# Patient Record
Sex: FEMALE | Race: WHITE | Employment: OTHER | URBAN - METROPOLITAN AREA
[De-identification: names, ages, dates, MRNs, and addresses within clinical notes are randomized per-mention and may not be internally consistent; named-entity substitution may affect disease eponyms.]

---

## 2019-10-17 ENCOUNTER — OFFICE VISIT (OUTPATIENT)
Dept: ORTHOPEDIC SURGERY | Age: 44
End: 2019-10-17

## 2019-10-17 VITALS
RESPIRATION RATE: 16 BRPM | HEART RATE: 61 BPM | SYSTOLIC BLOOD PRESSURE: 105 MMHG | OXYGEN SATURATION: 100 % | HEIGHT: 64 IN | TEMPERATURE: 97.9 F | BODY MASS INDEX: 20.93 KG/M2 | WEIGHT: 122.6 LBS | DIASTOLIC BLOOD PRESSURE: 70 MMHG

## 2019-10-17 DIAGNOSIS — M54.12 CERVICAL RADICULOPATHY: ICD-10-CM

## 2019-10-17 DIAGNOSIS — M19.012 ARTHRITIS OF LEFT STERNOCLAVICULAR JOINT: ICD-10-CM

## 2019-10-17 DIAGNOSIS — M54.2 NECK PAIN: Primary | ICD-10-CM

## 2019-10-17 RX ORDER — MELOXICAM 15 MG/1
15 TABLET ORAL
Qty: 30 TAB | Refills: 1 | Status: SHIPPED | OUTPATIENT
Start: 2019-10-17

## 2019-10-17 RX ORDER — METHYLPREDNISOLONE 4 MG/1
TABLET ORAL
Qty: 1 DOSE PACK | Refills: 0 | Status: SHIPPED | OUTPATIENT
Start: 2019-10-17

## 2019-10-17 RX ORDER — BACLOFEN 10 MG/1
10 TABLET ORAL 2 TIMES DAILY
Qty: 60 TAB | Refills: 1 | Status: SHIPPED | OUTPATIENT
Start: 2019-10-17 | End: 2019-11-09 | Stop reason: SDUPTHER

## 2019-10-17 NOTE — PROGRESS NOTES
Rosa Matias  1975   Chief Complaint   Patient presents with    Shoulder Pain     left shoulder        HISTORY OF PRESENT ILLNESS  Rosa Matias is a 45 y.o. female who presents today for evaluation of left shoulder pain. Pt referred by Dr. Cece Allen. She rates her pain 3/10 today. Pain has been present for 6 months. Pt reports swelling and soreness started around the sternal-clavicular joint. It then moved to the shoulder blade. She reports some numbness and tingling that moves down her arm to her fingers that started recently. Pain at night. Patient describes the pain as sore that is Intermittent in nature. Symptoms are worse with certain movement of the arm and neck, Activity and is better with  Rest. Associated symptoms include numbness, tingling. Since problem started, it: is unchanged. Pain does wake patient up at night. Has taken NSAID for the problem. Has tried following treatments: Injections:NO; Brace:NO; Therapy:NO; Cane/Crutch:NO       Allergies   Allergen Reactions    Shellfish Derived Itching        History reviewed. No pertinent past medical history.    Social History     Socioeconomic History    Marital status: UNKNOWN     Spouse name: Not on file    Number of children: Not on file    Years of education: Not on file    Highest education level: Not on file   Occupational History    Not on file   Social Needs    Financial resource strain: Not on file    Food insecurity:     Worry: Not on file     Inability: Not on file    Transportation needs:     Medical: Not on file     Non-medical: Not on file   Tobacco Use    Smoking status: Never Smoker    Smokeless tobacco: Never Used   Substance and Sexual Activity    Alcohol use: Not on file    Drug use: Not on file    Sexual activity: Not on file   Lifestyle    Physical activity:     Days per week: Not on file     Minutes per session: Not on file    Stress: Not on file   Relationships    Social connections:     Talks on phone: Not on file     Gets together: Not on file     Attends Jehovah's witness service: Not on file     Active member of club or organization: Not on file     Attends meetings of clubs or organizations: Not on file     Relationship status: Not on file    Intimate partner violence:     Fear of current or ex partner: Not on file     Emotionally abused: Not on file     Physically abused: Not on file     Forced sexual activity: Not on file   Other Topics Concern    Not on file   Social History Narrative    Not on file      History reviewed. No pertinent surgical history. Family History   Problem Relation Age of Onset    Hypertension Mother     Diabetes Mother     Heart Disease Father       No current outpatient medications on file. No current facility-administered medications for this visit. REVIEW OF SYSTEM   Patient denies: Weight loss, Fever/Chills, HA, Visual changes, Fatigue, Chest pain, SOB, Abdominal pain, N/V/D/C, Blood in stool or urine, Edema. Pertinent positive as above in HPI. All others were negative    PHYSICAL EXAM:   Visit Vitals  /70   Pulse 61   Temp 97.9 °F (36.6 °C) (Oral)   Resp 16   Ht 5' 4\" (1.626 m)   Wt 122 lb 9.6 oz (55.6 kg)   SpO2 100%   BMI 21.04 kg/m²     The patient is a well-developed, well-nourished female   in no acute distress. The patient is alert and oriented times three. The patient is alert and oriented times three. Mood and affect are normal.  LYMPHATIC: lymph nodes are not enlarged and are within normal limits  SKIN: normal in color and non tender to palpation. There are no bruises or abrasions noted. NEUROLOGICAL: Motor sensory exam is within normal limits. Reflexes are equal bilaterally.  There is normal sensation to pinprick and light touch  MUSCULOSKELETAL:  Examination Left shoulder   Skin Intact   AC joint tenderness -   Biceps tenderness -   Forward flexion/Elevation    Active abduction    Glenohumeral abduction 90   External rotation ROM 90 Internal rotation ROM 70   Apprehension -   Anmols Relocation -   Jerk -   Load and Shift -   Obriens -   Speeds -   Impingement sign -   Supraspinatus/Empty Can -, 5/5   External Rotation Strength -, 5/5   Lift Off/Belly Press -, 5/5   Neurovascular Intact   Swelling and tenderness over left sterno clavicular joint  Examination Neck   Skin Intact   Tenderness, Paracervical +   Paracervical spasms  +   Flexion Decreased 25%   Extension Decreased 25%   Lateral bend left Normal   Lateral bend right Normal   Masses -   Spurling sign +   Biceps reflex Normal   Triceps reflex Normal   Brachioradialis reflex Normal   Sensation Normal       IMAGING: XR of cervical spine dated 10/17/19 was reviewed and read: Loss of cervical lordosis. Decreased disc space at C4-C5 and C5-C6. IMPRESSION:      ICD-10-CM ICD-9-CM    1. Neck pain M54.2 723.1 AMB POC XRAY, SPINE, CERVICAL; 2 OR 3   2. Cervical radiculopathy M54.12 723.4 methylPREDNISolone (MEDROL DOSEPACK) 4 mg tablet      meloxicam (MOBIC) 15 mg tablet      baclofen (LIORESAL) 10 mg tablet      REFERRAL TO PHYSICAL THERAPY      MRI CERV SPINE WO CONT   3. Arthritis of left sternoclavicular joint M19.012 716.91 methylPREDNISolone (MEDROL DOSEPACK) 4 mg tablet      meloxicam (MOBIC) 15 mg tablet      baclofen (LIORESAL) 10 mg tablet      REFERRAL TO PHYSICAL THERAPY      MRI CERV SPINE WO CONT        PLAN:  1. Pt presents today with left shoulder and neck pain due to sternoclavicular arthritis and cervical radiculopathy. I would like her to begin PT and get an MRI of the cervical spine. Risk factors include: n/a  2. No ultrasound exam indicated today  3. No cortisone injection indicated today   4. Yes Physical/Occupational Therapy indicated today  5. Yes diagnostic test indicated today: MRI CERVICAL SPINE  6. No durable medical equipment indicated today  7. No referral indicated today   8. Yes medications indicated today: MEDROL DOSEPACK & BACLOFEN & MOBIC  9.  No Narcotic indicated today      RTC following MRI    Office note will be sent to referring provider    Scribed by Ananya Easton) as dictated by MD BRITTANY Correa, Dr. Ana Crooks, confirm that all documentation is accurate.     Ana Crooks M.D.   [facility]

## 2019-10-17 NOTE — PROGRESS NOTES
1. Have you been to the ER, urgent care clinic since your last visit? Hospitalized since your last visit? No    2. Have you seen or consulted any other health care providers outside of the [address] [place] [address] since your last visit? Include any pap smears or colon screening.  No

## 2019-11-09 DIAGNOSIS — M19.012 ARTHRITIS OF LEFT STERNOCLAVICULAR JOINT: ICD-10-CM

## 2019-11-09 DIAGNOSIS — M54.12 CERVICAL RADICULOPATHY: ICD-10-CM

## 2019-11-11 RX ORDER — BACLOFEN 10 MG/1
10 TABLET ORAL 2 TIMES DAILY
Qty: 60 TAB | Refills: 1 | Status: SHIPPED | OUTPATIENT
Start: 2019-11-11

## 2019-11-14 ENCOUNTER — HOSPITAL ENCOUNTER (OUTPATIENT)
Age: 44
Discharge: HOME | End: 2019-11-14
Attending: ORTHOPAEDIC SURGERY
Payer: COMMERCIAL

## 2019-11-14 DIAGNOSIS — M54.12 CERVICAL RADICULITIS: ICD-10-CM

## 2019-11-14 PROCEDURE — 72141 MRI NECK SPINE W/O DYE: CPT

## 2019-12-03 ENCOUNTER — OFFICE VISIT (OUTPATIENT)
Dept: ORTHOPEDIC SURGERY | Age: 44
End: 2019-12-03

## 2019-12-03 VITALS
HEIGHT: 64 IN | RESPIRATION RATE: 14 BRPM | DIASTOLIC BLOOD PRESSURE: 69 MMHG | HEART RATE: 66 BPM | OXYGEN SATURATION: 99 % | SYSTOLIC BLOOD PRESSURE: 112 MMHG | TEMPERATURE: 97.9 F | WEIGHT: 122.2 LBS | BODY MASS INDEX: 20.86 KG/M2

## 2019-12-03 DIAGNOSIS — M50.20 HERNIATED DISC, CERVICAL: Primary | ICD-10-CM

## 2019-12-03 NOTE — PROGRESS NOTES
1. Have you been to the ER, urgent care clinic since your last visit? Hospitalized since your last visit? No    2. Have you seen or consulted any other health care providers outside of the [address] [place] [address] since your last visit? Include any pap smears or colon screening.  No

## 2019-12-03 NOTE — PROGRESS NOTES
Yolie Aguirre  1975   Chief Complaint   Patient presents with    Shoulder Pain     right shoulder pain        HISTORY OF PRESENT ILLNESS  Yolie Aguirre is a 45 y.o. female who presents today for reevaluation of right shoulder pain and MRI review. Patient rates pain as 7/10 today. Pain has been present for over 7 months. Pt reports swelling and soreness started around the sternal-clavicular joint. It then moved to the shoulder blade. Pain at night. Still complains of numbness and tingling in the arms and hands and pain with turning the neck. Has taken Mobic and Baclofen. Patient denies any fever, chills, chest pain, shortness of breath or calf pain. The remainder of the review of systems is negative. There are no new illness or injuries to report since last seen in the office. There are no changes to medications, allergies, family or social history. Pain Assessment  12/3/2019   Location of Pain Shoulder   Location Modifiers Right   Severity of Pain 7   Quality of Pain Sharp   Quality of Pain Comment -   Duration of Pain Persistent   Frequency of Pain Constant   Aggravating Factors Bending;Stretching;Straightening   Relieving Factors Nothing   Result of Injury No     PHYSICAL EXAM:   Visit Vitals  /69 (BP 1 Location: Left arm, BP Patient Position: Sitting)   Pulse 66   Temp 97.9 °F (36.6 °C) (Oral)   Resp 14   Ht 5' 4\" (1.626 m)   Wt 122 lb 3.2 oz (55.4 kg)   SpO2 99%   BMI 20.98 kg/m²     The patient is a well-developed, well-nourished female   in no acute distress. The patient is alert and oriented times three. The patient is alert and oriented times three. Mood and affect are normal.  LYMPHATIC: lymph nodes are not enlarged and are within normal limits  SKIN: normal in color and non tender to palpation. There are no bruises or abrasions noted. NEUROLOGICAL: Motor sensory exam is within normal limits. Reflexes are equal bilaterally.  There is normal sensation to pinprick and light touch  MUSCULOSKELETAL:  Examination Left shoulder   Skin Intact   AC joint tenderness -   Biceps tenderness -   Forward flexion/Elevation    Active abduction    Glenohumeral abduction 90   External rotation ROM 90   Internal rotation ROM 70   Apprehension -   Anmols Relocation -   Jerk -   Load and Shift -   Obriens -   Speeds -   Impingement sign -   Supraspinatus/Empty Can -, 5/5   External Rotation Strength -, 5/5   Lift Off/Belly Press -, 5/5   Neurovascular Intact   Swelling and tenderness over left sterno clavicular joint  Examination Neck   Skin Intact   Tenderness, Paracervical +   Paracervical spasms  +   Flexion Decreased 25%   Extension Decreased 25%   Lateral bend left Normal   Lateral bend right Normal   Masses -   Spurling sign +   Biceps reflex Normal   Triceps reflex Normal   Brachioradialis reflex Normal   Sensation Normal        IMAGING: MRI of cervical spine dated 11/14/19 was reviewed and read by Dr. Amaya Come:   IMPRESSION:  C5/C6 disc and osteophyte complex with more focal disc components. No  significant central stenosis, but disc material in the foramina causing severe  left and mild to moderate right foraminal narrowing. C4/C5 disc and osteophyte complex with mild central canal stenosis, moderate  right and mild left foraminal narrowing. Less pronounced degenerative changes C3/C4 with posterior disc protrusion  causing borderline central canal narrowing, no foraminal narrowing. XR of cervical spine dated 10/17/19 was reviewed and read: Loss of cervical lordosis. Decreased disc space at C4-C5 and C5-C6. IMPRESSION:      ICD-10-CM ICD-9-CM    1. Herniated disc, cervical M50.20 722.0 REFERRAL TO SPINE SURGERY        PLAN:   1. Pt presents today with right shoulder and neck pain due to an MRI-documented cervical herniated disc and I would like to refer her to the [address] [place] [address]. She can continue to take the Baclofen and Mobic as needed. Risk factors include: n/a  2.  No ultrasound exam indicated today  3. No cortisone injection indicated today   4. No Physical/Occupational Therapy indicated today  5. No diagnostic test indicated today:   6. No durable medical equipment indicated today  7. Yes referral indicated today Kenneth  8. No medications indicated today:   9. No Narcotic indicated today       RTC prn      Scribed by [address]) as dictated by Marie Tom MD    I, Dr. Marie Tom, confirm that all documentation is accurate.     Marie Tom M.D.   [facility]

## 2019-12-06 ENCOUNTER — OFFICE VISIT (OUTPATIENT)
Dept: ORTHOPEDIC SURGERY | Age: 44
End: 2019-12-06

## 2019-12-06 VITALS
HEIGHT: 64 IN | HEART RATE: 61 BPM | DIASTOLIC BLOOD PRESSURE: 80 MMHG | WEIGHT: 121.6 LBS | BODY MASS INDEX: 20.76 KG/M2 | TEMPERATURE: 98.4 F | SYSTOLIC BLOOD PRESSURE: 127 MMHG | OXYGEN SATURATION: 100 % | RESPIRATION RATE: 19 BRPM

## 2019-12-06 DIAGNOSIS — M79.609 PARESTHESIA AND PAIN OF LEFT EXTREMITY: Primary | ICD-10-CM

## 2019-12-06 DIAGNOSIS — M47.812 CERVICAL SPONDYLOSIS: ICD-10-CM

## 2019-12-06 DIAGNOSIS — R20.2 PARESTHESIA AND PAIN OF LEFT EXTREMITY: Primary | ICD-10-CM

## 2019-12-06 DIAGNOSIS — M19.012 ARTHRITIS OF LEFT STERNOCLAVICULAR JOINT: ICD-10-CM

## 2019-12-06 RX ORDER — GABAPENTIN 300 MG/1
300 CAPSULE ORAL 3 TIMES DAILY
Qty: 90 CAP | Refills: 1 | Status: SHIPPED | OUTPATIENT
Start: 2019-12-06

## 2019-12-06 NOTE — PROGRESS NOTES
[address]  [address]  [place], [address] [place] [address] Street  Phone: [phone]  Fax: [phone]  INITIAL CONSULTATION  Patient: Lore Pearce                MRN: [id number]       SSN: [id number]  YOB: 1975        AGE: 45 y.o. SEX: female  Body mass index is 20.87 kg/m². PCP: Guadalupe Morrison MD  12/06/19    Chief Complaint   Patient presents with    Neck Pain     SC         HISTORY OF PRESENT ILLNESS, RADIOGRAPHS, and PLAN:         HISTORY OF PRESENT ILLNESS:  Ms. Acosta Kerr is seen today at the request of Dr. Ervin Hill. Ms. 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 She came in and saw Dr. Ervin Hill initially for some neck pain, some sternoclavicular joint swelling and arthritis and radiating numbness and tingling into her left arm. He gave her a Medrol Dosepak, which evidently resolved some of the sternoclavicular joint issues, but she continues with this cervical pseudo radiculopathy. She has numbness and tingling in her left hand, which is her dominant arm. She gets antalgic limited range of motion in her cervical spine. RADIOGRAPHS:  An MRI was ordered by Dr. Ervin Hill, which demonstrates spondylitic changes at C3-4, C4-5, C5-6 with some degree of left-sided foraminal stenosis, no dramatic central stenosis, and no gross instability evidenced on MRI. There is no signal change to the cord. PHYSICAL EXAM:  Her physical exam is benign other than her pain she is having in her arm. She has no reflex change and no motor change with some sense of numbness in the arm. She has no shoulder symptoms. ASSESSMENT/PLAN: At this point, I would like to get her in some physical therapy. I would like to obtain an EMG of her left upper extremity. We will start her on a dose of Neurontin and see if that is helpful, and we will have her take her NSAIDs on a regular basis.   I am going to have some lab work done on her. I would like to get a CBC, a sedimentation rate, and a C-reactive protein to see if there is any autoimmune or inflammatory markers present given the swelling she had figure-of-four this SC joint, and the pain associated with her neck and arm. We will see her back after these studies. cc: Nery Mills M.D. History reviewed. No pertinent past medical history. Family History   Problem Relation Age of Onset    Hypertension Mother     Diabetes Mother     Heart Disease Father        Current Outpatient Medications   Medication Sig Dispense Refill    baclofen (LIORESAL) 10 mg tablet TAKE 1 TAB BY MOUTH TWO (2) TIMES A DAY. TAKE AT NIGHT BEFORE BED 60 Tab 1    meloxicam (MOBIC) 15 mg tablet Take 1 Tab by mouth daily (with breakfast). 30 Tab 1    methylPREDNISolone (MEDROL DOSEPACK) 4 mg tablet Per dose pack instructions 1 Dose Pack 0       Allergies   Allergen Reactions    Shellfish Derived Itching       History reviewed. No pertinent surgical history. History reviewed. No pertinent past medical history.     Social History     Socioeconomic History    Marital status: UNKNOWN     Spouse name: Not on file    Number of children: Not on file    Years of education: Not on file    Highest education level: Not on file   Occupational History    Not on file   Social Needs    Financial resource strain: Not on file    Food insecurity:     Worry: Not on file     Inability: Not on file    Transportation needs:     Medical: Not on file     Non-medical: Not on file   Tobacco Use    Smoking status: Never Smoker    Smokeless tobacco: Never Used   Substance and Sexual Activity    Alcohol use: Not on file    Drug use: Not on file    Sexual activity: Not on file   Lifestyle    Physical activity:     Days per week: Not on file     Minutes per session: Not on file    Stress: Not on file   Relationships    Social connections:     Talks on phone: Not on file     Gets together: Not on file     Attends Cheondoism service: Not on file     Active member of club or organization: Not on file     Attends meetings of clubs or organizations: Not on file     Relationship status: Not on file    Intimate partner violence:     Fear of current or ex partner: Not on file     Emotionally abused: Not on file     Physically abused: Not on file     Forced sexual activity: Not on file   Other Topics Concern    Not on file   Social History Narrative    Not on file           REVIEW OF SYSTEMS:   CONSTITUTIONAL SYMPTOMS:  Negative. EYES:  Negative. EARS, NOSE, THROAT AND MOUTH:  Negative. CARDIOVASCULAR:  Negative. RESPIRATORY:  Negative. GENITOURINARY: Per HPI. GASTROINTESTINAL:  Per HPI. INTEGUMENTARY (SKIN AND/OR BREAST):  Negative. MUSCULOSKELETAL: Per HPI.   ENDOCRINE/RHEUMATOLOGIC:  Negative. NEUROLOGICAL:  Per HPI. HEMATOLOGIC/LYMPHATIC:  Negative. ALLERGIC/IMMUNOLOGIC:  Negative. PSYCHIATRIC:  Negative. PHYSICAL EXAMINATION:   Visit Vitals  /80 (BP 1 Location: Left arm, BP Patient Position: Sitting)   Pulse 61   Temp 98.4 °F (36.9 °C) (Oral)   Resp 19   Ht 5' 4\" (1.626 m)   Wt 121 lb 9.6 oz (55.2 kg)   SpO2 100%   BMI 20.87 kg/m²    PAIN SCALE: 5/10    CONSTITUTIONAL: The patient is in no apparent distress and is alert and oriented x 3. HEENT: Normocephalic. Hearing grossly intact. NECK: Supple and symmetric. no tenderness, or masses were felt. RESPIRATORY: No labored breathing. CARDIOVASCULAR: The carotid pulses were normal. Peripheral pulses were 2+. CHEST: Normal AP diameter and normal contour without any kyphoscoliosis. LYMPHATIC: No lymphadenopathy was appreciated in the neck, axillae or groin. SKIN:  Negative for scars, rashes, lesions, or ulcers on the right upper, right lower, left upper, left lower and trunk. NEUROLOGICAL: Alert and oriented x 3. Ambulation without assistive device. FWB.   EXTREMITIES:  See musculoskeletal.  MUSCULOSKELETAL:   Head and Neck:  Neck pain. Negative for misalignment, asymmetry, crepitation, defects, tenderness masses or effusions.  Left Upper Extremity: LHD. L shoulder pain into shoulder blade. Tingling in arm and hand. Inspection, percussion and palpation performed. Temples sign is negative.  Right Upper Extremity: Inspection, percussion and palpation performed. Temples sign is negative.  Spine, Ribs and Pelvis: Inspection, percussion and palpation performed. Negative for misalignment, asymmetry, crepitation, defects, tenderness masses or effusions.  Left Lower Extremity: Inspection, percussion and palpation performed. Negative straight leg raise.  Right Lower Extremity: Inspection, percussion and palpation performed. Negative straight leg raise. SPINE EXAM:     Cervical spine: Neck is midline. Normal muscle tone. No focal atrophy is noted. Biceps  Triceps Deltoids Wrist Ext Wrist Flex Hand Intrin   Right +4/5 +4/5 +4/5 +4/5 +4/5 +4/5   Left +4/5 +4/5 +4/5 +4/5 +4/5 +4/5       ASSESSMENT    ICD-10-CM ICD-9-CM    1. Paresthesia and pain of left extremity M79.609 729.5 EMG ONE EXTREMITY UPPER LT    R20.2 782.0 gabapentin (NEURONTIN) 300 mg capsule      CBC WITH AUTOMATED DIFF      SED RATE (ESR)      C REACTIVE PROTEIN, QT   2. Cervical spondylosis M47.812 721.0 gabapentin (NEURONTIN) 300 mg capsule      REFERRAL TO PHYSICAL THERAPY      CBC WITH AUTOMATED DIFF      SED RATE (ESR)      C REACTIVE PROTEIN, QT   3. Arthritis of left sternoclavicular joint M19.012 716.91 REFERRAL TO PHYSICAL THERAPY       Written by Cameron Angulo, as dictated by Mona Pierce MD.    I, Dr. Mona Pierce MD, confirm that all documentation is accurate.

## 2019-12-06 NOTE — PATIENT INSTRUCTIONS
Electromyogram (EMG) and Nerve Conduction Studies: About These Tests  What are they? An electromyogram (EMG) measures the electrical activity of your muscles when you are not using them (at rest) and when you tighten them (muscle contraction). Nerve conduction studies (NCS) measure how well and how fast the nerves can send electrical signals. EMG and nerve conduction studies are often done together. If they are done together, the nerve conduction studies are done before the EMG. Why are they done? You may need an EMG to find diseases that damage your muscles or nerves or to find why you cannot move your muscles (paralysis), why they feel weak, or why they twitch. You may need nerve conduction studies to find damage to the nerves that lead from the brain and spinal cord to the rest of the body (peripheral nervous system). Nerve conduction studies are often used to help find nerve disorders, such as carpal tunnel syndrome. How can you prepare for these tests? · Tell your doctors ALL the medicines, vitamins, supplements, and herbal remedies you take. Some medicines can affect the test results. You may need to stop taking some medicines before you have this test.     · If you take aspirin or some other blood thinner, be sure to talk to your doctor. He or she will tell you if you should stop taking it before your test. Make sure that you understand exactly what your doctor wants you to do.     · Wear loose-fitting clothing. You may be given a hospital gown to wear.     · The electrodes for the test are attached to your skin. Your skin needs to be clean and free of sprays, oils, creams, and lotions. What happens during the tests? You lie on a table or bed or sit in a reclining chair so your muscles are relaxed. For an EMG:  · Your doctor will insert a needle electrode into a muscle.  This will record the electrical activity while the muscle is at rest. You may feel a quick, sharp pain when the needle electrode is put into a muscle. · Your doctor will ask you to tighten the same muscle slowly and steadily while the electrical activity is recorded. · Your doctor may move the electrode to a different area of the muscle or a different muscle. For nerve conduction studies:  · Your doctor will attach two types of electrodes to your skin. ? One type of electrode is placed over a nerve and will give the nerve an electrical pulse. ? The other type of electrode is placed over the muscle that the nerve controls. It will record how long it takes the muscle to react to the electrical pulse. · You will be able to feel the electrical pulses. They are small shocks and are safe. What else should you know about these tests? · After an EMG, you may be sore and have a tingling feeling in your muscles for up to 2 days. You may have small bruises or swelling at the needle site. · For an EMG, you may be asked to sign a consent form. Talk to your doctor about any concerns you have about the need for the test, its risks, how it will be done, or what the results will mean. How long do they take? · An EMG may take 30 to 60 minutes. · Nerve conduction tests may take from 15 minutes to 1 hour or more. It depends on how many nerves and muscles your doctor tests. What happens after these tests? · If any of the test areas are sore:  ? Put ice or a cold pack on the area for 10 to 20 minutes at a time. Put a thin cloth between the ice and your skin. ? Take an over-the-counter pain medicine, such as acetaminophen (Tylenol), ibuprofen (Advil, Motrin), or naproxen (Aleve). Be safe with medicines. Read and follow all instructions on the label. · You will probably be able to go home right away. · You can go back to your usual activities right away. When should you call for help?   Watch closely for changes in your health, and be sure to contact your doctor if:  · Muscle pain from an EMG test gets worse or you have swelling, tenderness, or pus at any of the needle sites. · You have any problems that you think may be from the test.  · You have any questions about the test or have not received your results. Follow-up care is a key part of your treatment and safety. Be sure to make and go to all appointments, and call your doctor if you are having problems. It's also a good idea to keep a list of the medicines you take. Ask your doctor when you can expect to have your test results. Where can you learn more? Go to http://clemente-lillie.info/. Enter N846 in the search box to learn more about \"Electromyogram (EMG) and Nerve Conduction Studies: About These Tests. \"  Current as of: March 28, 2019  Content Version: 12.2  © 1113-3985 [employer]. Care instructions adapted under license by [employer] (which disclaims liability or warranty for this information). If you have questions about a medical condition or this instruction, always ask your healthcare professional. [address] any warranty or liability for your use of this information.

## 2019-12-17 ENCOUNTER — APPOINTMENT (OUTPATIENT)
Dept: PHYSICAL THERAPY | Age: 44
End: 2019-12-17

## 2020-01-20 ENCOUNTER — DOCUMENTATION ONLY (OUTPATIENT)
Dept: ORTHOPEDIC SURGERY | Age: 45
End: 2020-01-20

## 2020-01-20 NOTE — PROGRESS NOTES
Spoke with patient, called to make her EMG appointment, her son had his tonsils out today. She will call me back to get scheduled.

## 2021-09-27 ENCOUNTER — TRANSCRIBE ORDER (OUTPATIENT)
Dept: SCHEDULING | Age: 46
End: 2021-09-27

## 2021-09-27 DIAGNOSIS — Z12.31 VISIT FOR SCREENING MAMMOGRAM: Primary | ICD-10-CM

## 2021-10-28 ENCOUNTER — HOSPITAL ENCOUNTER (OUTPATIENT)
Dept: MAMMOGRAPHY | Age: 46
Discharge: HOME | End: 2021-10-28
Attending: NURSE PRACTITIONER
Payer: COMMERCIAL

## 2021-10-28 DIAGNOSIS — Z12.31 VISIT FOR SCREENING MAMMOGRAM: ICD-10-CM

## 2021-10-28 PROCEDURE — 77067 SCR MAMMO BI INCL CAD: CPT

## 2022-05-20 ENCOUNTER — TRANSCRIBE ORDER (OUTPATIENT)
Dept: SCHEDULING | Age: 47
End: 2022-05-20

## 2022-05-20 DIAGNOSIS — N63.25 MASS OVERLAPPING MULTIPLE QUADRANTS OF LEFT BREAST: Primary | ICD-10-CM

## 2022-05-23 ENCOUNTER — TRANSCRIBE ORDER (OUTPATIENT)
Dept: SCHEDULING | Age: 47
End: 2022-05-23

## 2022-05-23 DIAGNOSIS — N63.25 MASS OVERLAPPING MULTIPLE QUADRANTS OF LEFT BREAST: Primary | ICD-10-CM

## 2022-06-09 ENCOUNTER — HOSPITAL ENCOUNTER (OUTPATIENT)
Dept: ULTRASOUND IMAGING | Age: 47
Discharge: HOME | End: 2022-06-09
Attending: NURSE PRACTITIONER
Payer: COMMERCIAL

## 2022-06-09 ENCOUNTER — HOSPITAL ENCOUNTER (OUTPATIENT)
Dept: MAMMOGRAPHY | Age: 47
Discharge: HOME | End: 2022-06-09
Attending: NURSE PRACTITIONER
Payer: COMMERCIAL

## 2022-06-09 DIAGNOSIS — N63.25 MASS OVERLAPPING MULTIPLE QUADRANTS OF LEFT BREAST: ICD-10-CM

## 2022-06-09 PROCEDURE — 76642 ULTRASOUND BREAST LIMITED: CPT

## 2022-06-09 PROCEDURE — 77061 BREAST TOMOSYNTHESIS UNI: CPT

## 2022-07-12 ENCOUNTER — HOSPITAL ENCOUNTER (OUTPATIENT)
Dept: ULTRASOUND IMAGING | Age: 47
Discharge: HOME | End: 2022-07-12
Attending: SURGERY

## 2022-07-12 ENCOUNTER — HOSPITAL ENCOUNTER (OUTPATIENT)
Dept: MAMMOGRAPHY | Age: 47
Discharge: HOME | End: 2022-07-12
Attending: SURGERY

## 2022-07-12 DIAGNOSIS — N63.0 LUMP OR MASS IN BREAST: ICD-10-CM

## 2022-07-12 DIAGNOSIS — R92.8 ABNORMAL MAMMOGRAM: ICD-10-CM

## 2022-07-12 PROCEDURE — 19083 BX BREAST 1ST LESION US IMAG: CPT

## 2022-07-12 PROCEDURE — 77065 DX MAMMO INCL CAD UNI: CPT

## 2022-07-12 PROCEDURE — 74011000250 HC RX REV CODE- 250: Performed by: SURGERY

## 2022-07-12 PROCEDURE — 88305 TISSUE EXAM BY PATHOLOGIST: CPT

## 2022-07-12 RX ORDER — LIDOCAINE HYDROCHLORIDE AND EPINEPHRINE 10; 10 MG/ML; UG/ML
1.5 INJECTION, SOLUTION INFILTRATION; PERINEURAL
Status: COMPLETED | OUTPATIENT
Start: 2022-07-12 | End: 2022-07-12

## 2022-07-12 RX ORDER — LIDOCAINE HYDROCHLORIDE 10 MG/ML
5 INJECTION, SOLUTION EPIDURAL; INFILTRATION; INTRACAUDAL; PERINEURAL
Status: COMPLETED | OUTPATIENT
Start: 2022-07-12 | End: 2022-07-12

## 2022-07-12 RX ADMIN — LIDOCAINE HYDROCHLORIDE 5 ML: 10 INJECTION, SOLUTION EPIDURAL; INFILTRATION; INTRACAUDAL; PERINEURAL at 09:25

## 2022-07-12 RX ADMIN — LIDOCAINE HYDROCHLORIDE AND EPINEPHRINE 15 MG: 10; 10 INJECTION, SOLUTION INFILTRATION; PERINEURAL at 09:26

## 2024-05-23 ENCOUNTER — TRANSCRIBE ORDERS (OUTPATIENT)
Facility: HOSPITAL | Age: 49
End: 2024-05-23

## 2024-05-23 DIAGNOSIS — Z12.31 SCREENING MAMMOGRAM FOR HIGH-RISK PATIENT: Primary | ICD-10-CM

## 2024-06-13 ENCOUNTER — HOSPITAL ENCOUNTER (OUTPATIENT)
Facility: HOSPITAL | Age: 49
Discharge: HOME | End: 2024-06-13
Payer: COMMERCIAL

## 2024-06-13 VITALS — HEIGHT: 64 IN | WEIGHT: 125.66 LBS | BODY MASS INDEX: 21.45 KG/M2

## 2024-06-13 DIAGNOSIS — Z12.31 SCREENING MAMMOGRAM FOR HIGH-RISK PATIENT: ICD-10-CM

## 2024-06-13 PROCEDURE — 77063 BREAST TOMOSYNTHESIS BI: CPT

## 2025-05-28 ENCOUNTER — TRANSCRIBE ORDERS (OUTPATIENT)
Facility: HOSPITAL | Age: 50
End: 2025-05-28

## 2025-05-28 DIAGNOSIS — Z12.31 VISIT FOR SCREENING MAMMOGRAM: Primary | ICD-10-CM

## 2025-07-22 ENCOUNTER — HOSPITAL ENCOUNTER (OUTPATIENT)
Facility: HOSPITAL | Age: 50
Discharge: HOME | End: 2025-07-25
Payer: COMMERCIAL

## 2025-07-22 VITALS — HEIGHT: 64 IN | BODY MASS INDEX: 21.45 KG/M2 | WEIGHT: 125.66 LBS

## 2025-07-22 DIAGNOSIS — Z12.31 VISIT FOR SCREENING MAMMOGRAM: ICD-10-CM

## 2025-07-22 PROCEDURE — 77063 BREAST TOMOSYNTHESIS BI: CPT
